# Patient Record
Sex: FEMALE | Race: WHITE | URBAN - METROPOLITAN AREA
[De-identification: names, ages, dates, MRNs, and addresses within clinical notes are randomized per-mention and may not be internally consistent; named-entity substitution may affect disease eponyms.]

---

## 2017-02-01 ENCOUNTER — HOSPITAL ENCOUNTER (OUTPATIENT)
Dept: WOMENS IMAGING | Age: 42
Discharge: HOME | End: 2017-02-01
Payer: COMMERCIAL

## 2017-02-01 DIAGNOSIS — Z12.39 SCREENING BREAST EXAMINATION: ICD-10-CM

## 2017-02-01 PROCEDURE — G0202 SCR MAMMO BI INCL CAD: HCPCS

## 2023-05-16 ENCOUNTER — APPOINTMENT (OUTPATIENT)
Dept: PRIMARY CARE | Facility: CLINIC | Age: 48
End: 2023-05-16
Payer: COMMERCIAL

## 2023-08-16 DIAGNOSIS — G25.81 RESTLESS LEG: ICD-10-CM

## 2023-08-16 RX ORDER — PRAMIPEXOLE DIHYDROCHLORIDE 1 MG/1
1 TABLET ORAL NIGHTLY
COMMUNITY
End: 2023-08-16 | Stop reason: SDUPTHER

## 2023-08-16 RX ORDER — PRAMIPEXOLE DIHYDROCHLORIDE 1 MG/1
1 TABLET ORAL NIGHTLY
Qty: 90 TABLET | Refills: 0 | Status: SHIPPED | OUTPATIENT
Start: 2023-08-16

## 2023-09-06 ENCOUNTER — APPOINTMENT (OUTPATIENT)
Dept: PRIMARY CARE | Facility: CLINIC | Age: 48
End: 2023-09-06
Payer: COMMERCIAL

## 2023-09-19 ENCOUNTER — APPOINTMENT (OUTPATIENT)
Dept: PRIMARY CARE | Facility: CLINIC | Age: 48
End: 2023-09-19
Payer: COMMERCIAL

## 2023-10-10 ENCOUNTER — APPOINTMENT (OUTPATIENT)
Dept: PRIMARY CARE | Facility: CLINIC | Age: 48
End: 2023-10-10
Payer: COMMERCIAL

## 2024-05-28 ENCOUNTER — HOSPITAL ENCOUNTER (EMERGENCY)
Facility: HOSPITAL | Age: 49
Discharge: HOME | End: 2024-05-28
Attending: STUDENT IN AN ORGANIZED HEALTH CARE EDUCATION/TRAINING PROGRAM

## 2024-05-28 VITALS
RESPIRATION RATE: 18 BRPM | HEIGHT: 61 IN | BODY MASS INDEX: 28.32 KG/M2 | WEIGHT: 150 LBS | HEART RATE: 90 BPM | OXYGEN SATURATION: 100 % | SYSTOLIC BLOOD PRESSURE: 124 MMHG | DIASTOLIC BLOOD PRESSURE: 86 MMHG | TEMPERATURE: 98.2 F

## 2024-05-28 DIAGNOSIS — L72.9 CYST OF SKIN: Primary | ICD-10-CM

## 2024-05-28 PROCEDURE — 10140 I&D HMTMA SEROMA/FLUID COLLJ: CPT | Performed by: STUDENT IN AN ORGANIZED HEALTH CARE EDUCATION/TRAINING PROGRAM

## 2024-05-28 PROCEDURE — 2500000001 HC RX 250 WO HCPCS SELF ADMINISTERED DRUGS (ALT 637 FOR MEDICARE OP): Performed by: STUDENT IN AN ORGANIZED HEALTH CARE EDUCATION/TRAINING PROGRAM

## 2024-05-28 PROCEDURE — 87205 SMEAR GRAM STAIN: CPT | Mod: STJLAB | Performed by: STUDENT IN AN ORGANIZED HEALTH CARE EDUCATION/TRAINING PROGRAM

## 2024-05-28 PROCEDURE — 99284 EMERGENCY DEPT VISIT MOD MDM: CPT | Performed by: STUDENT IN AN ORGANIZED HEALTH CARE EDUCATION/TRAINING PROGRAM

## 2024-05-28 PROCEDURE — 2500000005 HC RX 250 GENERAL PHARMACY W/O HCPCS: Performed by: STUDENT IN AN ORGANIZED HEALTH CARE EDUCATION/TRAINING PROGRAM

## 2024-05-28 PROCEDURE — 99283 EMERGENCY DEPT VISIT LOW MDM: CPT

## 2024-05-28 RX ORDER — DOXYCYCLINE 100 MG/1
100 TABLET ORAL 2 TIMES DAILY
Qty: 14 TABLET | Refills: 0 | Status: SHIPPED | OUTPATIENT
Start: 2024-05-28 | End: 2024-06-04

## 2024-05-28 RX ORDER — DOXYCYCLINE 100 MG/1
100 CAPSULE ORAL ONCE
Status: COMPLETED | OUTPATIENT
Start: 2024-05-28 | End: 2024-05-28

## 2024-05-28 RX ORDER — LIDOCAINE HYDROCHLORIDE AND EPINEPHRINE 10; 10 MG/ML; UG/ML
5 INJECTION, SOLUTION INFILTRATION; PERINEURAL ONCE
Status: COMPLETED | OUTPATIENT
Start: 2024-05-28 | End: 2024-05-28

## 2024-05-28 RX ADMIN — LIDOCAINE HYDROCHLORIDE,EPINEPHRINE BITARTRATE 5 ML: 10; .01 INJECTION, SOLUTION INFILTRATION; PERINEURAL at 18:13

## 2024-05-28 RX ADMIN — DOXYCYCLINE HYCLATE 100 MG: 100 CAPSULE ORAL at 18:39

## 2024-05-28 ASSESSMENT — COLUMBIA-SUICIDE SEVERITY RATING SCALE - C-SSRS
2. HAVE YOU ACTUALLY HAD ANY THOUGHTS OF KILLING YOURSELF?: NO
1. IN THE PAST MONTH, HAVE YOU WISHED YOU WERE DEAD OR WISHED YOU COULD GO TO SLEEP AND NOT WAKE UP?: NO
6. HAVE YOU EVER DONE ANYTHING, STARTED TO DO ANYTHING, OR PREPARED TO DO ANYTHING TO END YOUR LIFE?: NO

## 2024-05-28 ASSESSMENT — LIFESTYLE VARIABLES
TOTAL SCORE: 0
HAVE PEOPLE ANNOYED YOU BY CRITICIZING YOUR DRINKING: NO
HAVE YOU EVER FELT YOU SHOULD CUT DOWN ON YOUR DRINKING: NO
EVER FELT BAD OR GUILTY ABOUT YOUR DRINKING: NO
EVER HAD A DRINK FIRST THING IN THE MORNING TO STEADY YOUR NERVES TO GET RID OF A HANGOVER: NO

## 2024-05-28 ASSESSMENT — PAIN SCALES - GENERAL: PAINLEVEL_OUTOF10: 10 - WORST POSSIBLE PAIN

## 2024-05-28 ASSESSMENT — PAIN - FUNCTIONAL ASSESSMENT: PAIN_FUNCTIONAL_ASSESSMENT: 0-10

## 2024-05-28 ASSESSMENT — PAIN DESCRIPTION - LOCATION: LOCATION: BACK

## 2024-05-28 NOTE — DISCHARGE INSTRUCTIONS
Please continue to monitor for any signs of infection.  At this time your cyst does not appear to be infected there is no surrounding redness or warmth.  May continue with hot compresses for 15 minutes every hour.  May utilize Tylenol Motrin for any discomfort in the area.  Should he develop any fevers, redness, any new or worsening symptoms please come back to emergency room for further evaluation.  Otherwise please follow-up with the general surgery team for outpatient drainage and potential removal. You may remove the dressing in 24 hours.

## 2024-05-28 NOTE — ED PROVIDER NOTES
HPI   Chief Complaint   Patient presents with    Cyst     Patient states she has a cyst on her back, last had one in the same area 2017 had to  have it drained       48-year-old female present to the emergency department for evaluation of cyst on her left side of her back.  States it is for started developing approximate 1 week ago.  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  She does endorse a prior history of a cyst in this area approximately 9 years ago.  States that it did become infected at that time.  She denies any history of immunocompromise disease, recurrent skin infections or uncontrolled diabetes.  Denies any fever, chills, sweats.  Has been trying warm compress as well as Tylenol Motrin at home for the discomfort in the area.  She denies any warmth or redness around the site.  Has been intermittently able to express some discharge from the site.  States is not obviously bloody.  Has not noticed a foul odor from the area.                          Adeel Coma Scale Score: 15                     Patient History   Past Medical History:   Diagnosis Date    Personal history of other diseases of the respiratory system 07/08/2019    History of acute bronchitis    Personal history of other mental and behavioral disorders 03/20/2016    History of attention deficit hyperactivity disorder (ADHD)    Personal history of other mental and behavioral disorders 04/23/2021    History of depression    Unspecified ectopic pregnancy without intrauterine pregnancy ([facility]) 03/20/2016    Ectopic pregnancy     Past Surgical History:   Procedure Laterality Date    OTHER SURGICAL HISTORY  10/21/2019    Bilateral salpingectomy    OTHER SURGICAL HISTORY  10/21/2019    Oophorectomy For Ectopic Pregnancy Left Ovary     No family history on file.  Social History     Tobacco Use    Smoking  status: Never    Smokeless tobacco: Never   Vaping Use    Vaping status: Never Used   Substance Use Topics    Alcohol use: Never    Drug use: Never       Physical Exam   ED Triage Vitals [05/28/24 1707]   Temperature Heart Rate Respirations BP   36.6 °C (97.9 °F) 93 18 123/85      Pulse Ox Temp Source Heart Rate Source Patient Position   100 % Temporal -- Sitting      BP Location FiO2 (%)     Right arm --       Physical Exam  Vitals and nursing note reviewed.   Constitutional:       General: She is not in acute distress.     Appearance: She is well-developed. She is not ill-appearing.   HENT:      Head: Normocephalic and atraumatic.      Nose: No congestion or rhinorrhea.      Mouth/Throat:      Mouth: Mucous membranes are moist.      Pharynx: No oropharyngeal exudate or posterior oropharyngeal erythema.   Eyes:      Conjunctiva/sclera: Conjunctivae normal.   Cardiovascular:      Rate and Rhythm: Normal rate and regular rhythm.      Pulses: Normal pulses.      Heart sounds: No murmur heard.     No gallop.   Pulmonary:      Effort: Pulmonary effort is normal. No respiratory distress.      Breath sounds: Normal breath sounds. No stridor. No wheezing, rhonchi or rales.   Abdominal:      General: Bowel sounds are normal. There is no distension.      Palpations: Abdomen is soft.      Tenderness: There is no abdominal tenderness. There is no guarding or rebound.   Musculoskeletal:         General: No swelling.      Cervical back: Neck supple.   Skin:     General: Skin is warm and dry.      Capillary Refill: Capillary refill takes less than 2 seconds.      Findings: No rash.      Comments: 4 cm fluctuant mass to the left paraspinal region of the mid thorax.  Unable to express any discharge.  No surrounding erythema or induration.  No involvement over the actual midline spinous process region.   Neurological:      General: No focal deficit present.      Mental Status: She is alert and oriented to person, place, and time.       Cranial Nerves: No cranial nerve deficit.      Sensory: No sensory deficit.      Gait: Gait normal.   Psychiatric:         Mood and Affect: Mood normal.         Behavior: Behavior normal.         Thought Content: Thought content normal.         ED Course & MDM   ED Course as of 05/28/24 1824   Tue May 28, 2024   1807 Point-of-care ultrasound does reveal fluid containing structure with no color flow to suggest no vascularity.  Patient consents for incision and drainage.  Please see separate procedure note. [TL]      ED Course User Index  [TL] Jus Xavier DO         Diagnoses as of 05/28/24 1824   Cyst of skin       Medical Decision Making  48-year-old female presenting with fluid collection in the back consistent with likely cyst.  Has been intermittently able to express fluid.  No surrounding signs of erythema or warmth.  Unable to be expressed at the bedside.  Given patient discomfort I did offer outpatient expedited referral to general surgery given that there is not appear to be any sign of infection at this time.  Given the discomfort the patient requested ED incision and drainage.  Ultrasound did confirm at bedside via point-of-care interpretation fluid collection.    After point-of-care ultrasound did reveal fluid collection verbal consent obtained for incision and drainage.  Revealed thick material.  No blood.  Patient tolerated procedure well with local anesthetic.  Clean dry dressing applied and outpatient general surgery follow-up recommended.  Will start patient on doxycycline to prevent any secondary infection.  Twice daily dosing for 7 days prescribed.  Patient given return precautions and wound management structures and she expressed understanding.    Procedure  Incision and Drainage    Performed by: Jus Xavier DO  Authorized by: Ha Carlson MD    Consent:     Consent obtained:  Verbal    Consent given by:  Patient    Risks, benefits, and alternatives were discussed: yes      Risks  discussed:  Bleeding, incomplete drainage, pain, infection and damage to other organs    Alternatives discussed:  No treatment, delayed treatment, alternative treatment, observation and referral  Universal protocol:     Procedure explained and questions answered to patient or proxy's satisfaction: yes      Imaging studies available: yes (POCUS)      Required blood products, implants, devices, and special equipment available: yes      Site/side marked: yes      Immediately prior to procedure, a time out was called: yes      Patient identity confirmed:  Verbally with patient and arm band  Location:     Type:  Fluid collection    Size:  4cm    Location:  Trunk    Trunk location:  Back  Pre-procedure details:     Skin preparation:  Chlorhexidine  Sedation:     Sedation type:  None  Anesthesia:     Anesthesia method:  Local infiltration    Local anesthetic:  Lidocaine 1% WITH epi  Procedure type:     Complexity:  Simple  Procedure details:     Ultrasound guidance: yes      Needle aspiration: no      Incision types:  Single straight    Incision depth:  Dermal    Wound management:  Probed and deloculated, irrigated with saline and extensive cleaning    Drainage:  Purulent    Drainage amount:  Moderate    Wound treatment:  Wound left open    Packing materials:  None  Post-procedure details:     Procedure completion:  Tolerated       Jus Xavier DO  05/28/24 1824

## 2024-05-29 ENCOUNTER — TELEPHONE (OUTPATIENT)
Dept: SURGERY | Facility: CLINIC | Age: 49
End: 2024-05-29

## 2024-05-29 ENCOUNTER — TELEPHONE (OUTPATIENT)
Dept: GASTROENTEROLOGY | Facility: CLINIC | Age: 49
End: 2024-05-29

## 2024-05-29 ENCOUNTER — APPOINTMENT (OUTPATIENT)
Dept: SURGERY | Facility: CLINIC | Age: 49
End: 2024-05-29

## 2024-05-29 NOTE — TELEPHONE ENCOUNTER
Received a message that patient was scheduled inappropriately at the breast clinic with Dr. Carrera for a cyst on her back.  I left a detailed message explaining that I would like to get her scheduled correctly with Dr. Carrera with my direct phone number.  Upon call back will schedule accordingly.

## 2024-06-05 ENCOUNTER — OFFICE VISIT (OUTPATIENT)
Dept: SURGERY | Facility: CLINIC | Age: 49
End: 2024-06-05

## 2024-06-05 VITALS
DIASTOLIC BLOOD PRESSURE: 86 MMHG | BODY MASS INDEX: 28.32 KG/M2 | SYSTOLIC BLOOD PRESSURE: 144 MMHG | OXYGEN SATURATION: 99 % | WEIGHT: 150 LBS | TEMPERATURE: 97.8 F | RESPIRATION RATE: 17 BRPM | HEIGHT: 61 IN | HEART RATE: 84 BPM

## 2024-06-05 DIAGNOSIS — L72.0 EPIDERMAL INCLUSION CYST: Primary | ICD-10-CM

## 2024-06-05 PROBLEM — H10.9 CONJUNCTIVITIS, BACTERIAL: Status: ACTIVE | Noted: 2024-06-05

## 2024-06-05 PROBLEM — S92.919A FRACTURE OF PHALANX OF TOE: Status: RESOLVED | Noted: 2024-06-05 | Resolved: 2024-06-05

## 2024-06-05 PROBLEM — F32.A DEPRESSIVE DISORDER: Status: ACTIVE | Noted: 2024-06-05

## 2024-06-05 PROBLEM — J20.9 ACUTE BRONCHITIS: Status: RESOLVED | Noted: 2024-06-05 | Resolved: 2024-06-05

## 2024-06-05 PROBLEM — F90.0 ADHD (ATTENTION DEFICIT HYPERACTIVITY DISORDER), INATTENTIVE TYPE: Status: ACTIVE | Noted: 2024-06-05

## 2024-06-05 PROBLEM — G93.40 ENCEPHALOPATHY: Status: ACTIVE | Noted: 2024-06-05

## 2024-06-05 PROBLEM — N97.1 FEMALE INFERTILITY OF TUBAL ORIGIN: Status: ACTIVE | Noted: 2024-06-05

## 2024-06-05 PROBLEM — R10.2 PELVIC PAIN IN FEMALE: Status: ACTIVE | Noted: 2024-06-05

## 2024-06-05 PROBLEM — J06.9 URTI (ACUTE UPPER RESPIRATORY INFECTION): Status: RESOLVED | Noted: 2024-06-05 | Resolved: 2024-06-05

## 2024-06-05 PROBLEM — M54.50 LOW BACK PAIN: Status: ACTIVE | Noted: 2024-06-05

## 2024-06-05 PROBLEM — O00.90 ECTOPIC PREGNANCY (HHS-HCC): Status: ACTIVE | Noted: 2024-06-05

## 2024-06-05 PROBLEM — R61 GENERALIZED HYPERHIDROSIS: Status: ACTIVE | Noted: 2018-01-24

## 2024-06-05 PROBLEM — R05.9 COUGH: Status: RESOLVED | Noted: 2018-01-24 | Resolved: 2024-06-05

## 2024-06-05 PROBLEM — L25.9 CONTACT DERMATITIS: Status: ACTIVE | Noted: 2024-06-05

## 2024-06-05 PROBLEM — G25.81 RESTLESS LEGS SYNDROME: Status: ACTIVE | Noted: 2024-06-05

## 2024-06-05 LAB
BACTERIA SPEC CULT: ABNORMAL
GRAM STN SPEC: ABNORMAL
GRAM STN SPEC: ABNORMAL

## 2024-06-05 PROCEDURE — 99203 OFFICE O/P NEW LOW 30 MIN: CPT | Performed by: SURGERY

## 2024-06-05 PROCEDURE — 1036F TOBACCO NON-USER: CPT | Performed by: SURGERY

## 2024-06-05 RX ORDER — MULTIVITAMIN
TABLET ORAL
COMMUNITY
Start: 2019-10-21

## 2024-06-05 RX ORDER — DEXTROAMPHETAMINE SACCHARATE, AMPHETAMINE ASPARTATE, DEXTROAMPHETAMINE SULFATE AND AMPHETAMINE SULFATE 3.75; 3.75; 3.75; 3.75 MG/1; MG/1; MG/1; MG/1
TABLET ORAL
COMMUNITY
Start: 2022-06-13

## 2024-06-05 RX ORDER — AMPHETAMINE 15 MG/1
1 TABLET, EXTENDED RELEASE ORAL
COMMUNITY
Start: 2023-08-17

## 2024-06-05 RX ORDER — OFLOXACIN 3 MG/ML
SOLUTION/ DROPS OPHTHALMIC
COMMUNITY
Start: 2023-05-01

## 2024-06-05 RX ORDER — LISDEXAMFETAMINE DIMESYLATE 60 MG/1
60 CAPSULE ORAL DAILY
COMMUNITY
Start: 2023-07-14

## 2024-06-05 RX ORDER — DEXTROAMPHETAMINE SACCHARATE, AMPHETAMINE ASPARTATE MONOHYDRATE, DEXTROAMPHETAMINE SULFATE AND AMPHETAMINE SULFATE 7.5; 7.5; 7.5; 7.5 MG/1; MG/1; MG/1; MG/1
30 CAPSULE, EXTENDED RELEASE ORAL DAILY
COMMUNITY
Start: 2024-05-31

## 2024-06-05 RX ORDER — LISDEXAMFETAMINE DIMESYLATE 70 MG/1
70 CAPSULE ORAL EVERY MORNING
COMMUNITY
Start: 2023-12-20

## 2024-06-05 ASSESSMENT — PAIN SCALES - GENERAL: PAINLEVEL: 0-NO PAIN

## 2024-06-05 NOTE — PROGRESS NOTES
"History Of Present Illness :  Kira Rodriguez is a 48 y.o. female who presents on referral from Jus Xavier DO emergency room provider at [facility].  The patient was seen on 5/20/2024 and that note was reviewed.  The patient was found to have an infected cyst of her left back.  On exam, the following was noted: \" 4 cm fluctuant mass to the left paraspinal region of the mid thorax.  Unable to express any discharge.  No surrounding erythema or induration.  No involvement over the actual midline spinous process region.\"  Point-of-care ultrasound revealed a fluid collection.  This area was incised and drained.  Culture noted as below.  The patient was discharged home on doxycycline twice daily x 7 days.    Tissue/Wound Culture/Smear No growth to date               Gram Stain  Abnormal     (1+) Rare Polymorphonuclear leukocytes      (2+) Few Gram positive cocci              The patient has no primary medical physician currently.  She does relate a history of a 10-year history of a skin cyst or epidermal inclusion cyst of the mid central back.  She notes that 10 years ago in 2014 this became infected and abscessed and she actually became septic and this required hospital admission.  The lesion was lanced at that time.  This was never completely excised.    Since then, she has had no issues but has noticed a persistent thickening or nodule.  This was asymptomatic until the above incident.    Since discharge from emergency room the patient is felt better.  She has been on ibuprofen and Tylenol and warm compresses.  She is nearly finished with her antibiotic.  She has no fever chills sweats pain or drainage.    Patient is single and .  She lives in [place].  She has 2 adult sons.  She is currently unemployed.  She is working on a real estate license.      Past Medical History   Past Medical History:   Diagnosis Date    Personal history of other diseases of the respiratory system 07/08/2019    History of acute " bronchitis    Personal history of other mental and behavioral disorders 03/20/2016    History of attention deficit hyperactivity disorder (ADHD)    Personal history of other mental and behavioral disorders 04/23/2021    History of depression    Unspecified ectopic pregnancy without intrauterine pregnancy ([facility]) 03/20/2016    Ectopic pregnancy        Surgical History    Past Surgical History:   Procedure Laterality Date    OTHER SURGICAL HISTORY  10/21/2019    Bilateral salpingectomy    OTHER SURGICAL HISTORY  10/21/2019    Oophorectomy For Ectopic Pregnancy Left Ovary        Allergies   Allergies   Allergen Reactions    Bactrim [Sulfamethoxazole-Trimethoprim] Other    Vancomycin Other        Home Meds  Current Outpatient Medications   Medication Instructions    doxycycline (ADOXA) 100 mg, oral, 2 times daily, Take with a full glass of water and do not lie down for at least 30 minutes after    pramipexole (MIRAPEX) 1 mg, oral, Nightly        Family History    No family history on file.     Social History  Social History     Tobacco Use    Smoking status: Never    Smokeless tobacco: Never   Vaping Use    Vaping status: Never Used   Substance Use Topics    Alcohol use: Never    Drug use: Never        Review Of Systems    Review of Systems    General: Not Present- Obesity, Cancer, HIV, MRSA, Recent Cold/Flu, Tired During the Day and VRE.  HEENT: Not Present- Migraine, Cataracts, Glaucoma, Macular Degeneration and Retinal Detachment.  Respiratory: Not Present- Asthma, Chronic Cough, Difficulty Breathing on Exertion, Difficulty Breathing at Rest, Emphysema, Frequent Bronchitis, Home CPAP/BiPAP, Home Oxygen, Pulmonary Embolus, Pneumonia/TB, Sleep Apnea and Snoring.  Cardiovascular: Not Present- Chest Pain, Congestive Heart Failure, Heart Attack, Coronary Artery Disease, Heart Stent, High Cholesterol/Lipids, Hypertension, Internal Defibrillator, Irregular Heart Beat, Mitral Valve Prolapse, Murmur, Pacemaker and  Peripheral Vascular Disease.  Gastrointestinal: Not Present- Heartburn, Hepatitis, Hiatal Hernia, Jaundice, Reflux, Stomach Ulcer and IBS.  Female Genitourinary: Not Present- Kidney Failure, Kidney Stones, Dialysis and Urinary Tract Infection.  Musculoskeletal: Not Present- Arthritis and Fibromyalgia.  Neurological: Not Present- Headaches, Numbness, Tingling, Seizures, Stroke,  Shunt and Weakness.  Psychiatric: Not Present- Anxiety, Bipolar, Depression and Panic Attacks.  Endocrine: Not Present- Diabetes, Hyperthyroidism, Hypothyroidism and Low Blood Sugar.  Hematology: Not Present- Abnormal Bleeding, Anemia and Blood Clots.    Vitals  There were no vitals taken for this visit.     Physical Exam   Skin & Soft Tissue Exam    Integumentary  Global Assessment: Examination of related systems reveals - Well-developed, well-nourished and in no acute distress; alert and oriented x 3,  Neck supple, with no palpable masses, no thyromegaly, No lymphadenopathy and Apocrine and  eccrine glands are normal with no hyperhidrosis.   Upon inspection and palpation of skin surfaces of the - Head/Face: no rashes, ulcers, lesions or evidence of photo damage. No palpable nodules or masses, Neck: no visible lesions or palpable masses, Chest: no swelling,scarring or lesions. No prominent arteries or veins observed, Axillae: non-tender, no inflammation or ulceration, no drainage., Abdomen: no scars, rashes or lesions, Back: normal skin surface, no evidence of  photo damage, no suspicious lesions, Right upper extremity: no lesions or rashes. No evidence of photo damage, Left upper extremity: no lesions or rashes. No evidence of photo damage, Right lower extremity: no stasis ulcers, rashes or suspicious lesions. No evidence of photo damage, Left lower extremity: no stasis ulcers, rashes or suspicious lesions. No evidence of  photo damage, Distribution of scalp and body hair is normal and No dandruff or other scalp lesions noted.    Back: In  the mid central back, roughly at T8, slightly to the left of midline, there is a 2 x 2 area of skin thickening with underlying induration; there is not well-defined nodule; centrally there is a 5 mm dry scab; there is no erythema fluctuance or drainage with palpation    Chest and Lung Exam  Chest and lung exam reveals - normal excursion with symmetric chest walls and on auscultation, normal breath sounds, no  adventitious sounds and normal vocal resonance.    Cardiovascular  Cardiovascular examination reveals - normal heart sounds, regular rate and rhythm with no murmurs.    Assessment/Plan   Ms. Rodriguez has a history of an epidermal inclusion cyst of the mid central back.  This was infected roughly 10 years ago in 2014, and again had a recent infection as noted above.    Recommendations:    At some point, I do recommend complete excision of this residual epidermal inclusion cyst.  This will be under local anesthesia as an outpatient at [facility].    At this point, the patient would like to wait secondary to personal and insurance/financial reasons which I think is reasonable.  She will see me back in 2 months in my office for reevaluation and possible scheduling of the operation.    In the meantime, she will finish the antibiotics.  She will call or return in the meantime if she has recurrent symptoms.

## 2024-06-06 ENCOUNTER — TELEPHONE (OUTPATIENT)
Dept: PHARMACY | Facility: HOSPITAL | Age: 49
End: 2024-06-06

## 2024-06-06 NOTE — PROGRESS NOTES
EDPD Note: Rapid Result Review    Reviewed Mr./Mrs./Ms. Kira Rodriguez 's chart regarding a positive wound culture/result that was taken during their recent emergency room visit. The patient was not told about these results prior to leaving the emergency department. Patient did have follow up visit with surgery team yesterday and was told to continue Doxycycline that was given at ED discharge. Therefore, patient was not contacted as education was provided by general surgery team on 6/5/24    No further follow up needed from EDPD Team.     Mary Luo, PharmD

## 2024-08-14 ENCOUNTER — APPOINTMENT (OUTPATIENT)
Dept: SURGERY | Facility: CLINIC | Age: 49
End: 2024-08-14

## 2024-09-12 ENCOUNTER — APPOINTMENT (OUTPATIENT)
Dept: URGENT CARE | Age: 49
End: 2024-09-12

## 2024-09-13 ENCOUNTER — OFFICE VISIT (OUTPATIENT)
Dept: URGENT CARE | Age: 49
End: 2024-09-13
Payer: COMMERCIAL

## 2024-09-13 VITALS
WEIGHT: 155 LBS | DIASTOLIC BLOOD PRESSURE: 87 MMHG | HEART RATE: 72 BPM | TEMPERATURE: 97.7 F | HEIGHT: 61 IN | SYSTOLIC BLOOD PRESSURE: 137 MMHG | BODY MASS INDEX: 29.27 KG/M2 | RESPIRATION RATE: 14 BRPM | OXYGEN SATURATION: 97 %